# Patient Record
Sex: MALE | URBAN - METROPOLITAN AREA
[De-identification: names, ages, dates, MRNs, and addresses within clinical notes are randomized per-mention and may not be internally consistent; named-entity substitution may affect disease eponyms.]

---

## 2020-01-03 ENCOUNTER — APPOINTMENT (RX ONLY)
Dept: URBAN - METROPOLITAN AREA CLINIC 282 | Facility: CLINIC | Age: 85
Setting detail: DERMATOLOGY
End: 2020-01-03

## 2020-01-03 DIAGNOSIS — L20.84 INTRINSIC (ALLERGIC) ECZEMA: ICD-10-CM

## 2020-01-03 DIAGNOSIS — L82.1 OTHER SEBORRHEIC KERATOSIS: ICD-10-CM

## 2020-01-03 PROCEDURE — ? COUNSELING

## 2020-01-03 PROCEDURE — 99213 OFFICE O/P EST LOW 20 MIN: CPT

## 2020-01-03 PROCEDURE — ? PRESCRIPTION

## 2020-01-03 RX ORDER — DESOXIMETASONE 2.5 MG/G
CREAM TOPICAL
Qty: 1 | Refills: 2 | Status: ERX | COMMUNITY
Start: 2020-01-03

## 2020-01-03 RX ADMIN — DESOXIMETASONE: 2.5 CREAM TOPICAL at 00:00

## 2020-01-03 ASSESSMENT — LOCATION ZONE DERM
LOCATION ZONE: LEG
LOCATION ZONE: HAND
LOCATION ZONE: TRUNK

## 2020-01-03 ASSESSMENT — LOCATION SIMPLE DESCRIPTION DERM
LOCATION SIMPLE: LEFT HAND
LOCATION SIMPLE: LEFT PRETIBIAL REGION
LOCATION SIMPLE: RIGHT PRETIBIAL REGION
LOCATION SIMPLE: LOWER BACK

## 2020-01-03 ASSESSMENT — LOCATION DETAILED DESCRIPTION DERM
LOCATION DETAILED: LEFT RADIAL DORSAL HAND
LOCATION DETAILED: SUPERIOR LUMBAR SPINE
LOCATION DETAILED: LEFT DISTAL PRETIBIAL REGION
LOCATION DETAILED: RIGHT DISTAL PRETIBIAL REGION

## 2020-01-03 NOTE — PROCEDURE: MIPS QUALITY
Quality 402: Tobacco Use And Help With Quitting Among Adolescents: Patient screened for tobacco and never smoked
Quality 110: Preventive Care And Screening: Influenza Immunization: Influenza Immunization Administered during Influenza season
Quality 111:Pneumonia Vaccination Status For Older Adults: Pneumococcal Vaccination Previously Received
Quality 130: Documentation Of Current Medications In The Medical Record: Current Medications Documented
Detail Level: Detailed

## 2021-02-11 ENCOUNTER — APPOINTMENT (RX ONLY)
Dept: URBAN - METROPOLITAN AREA CLINIC 282 | Facility: CLINIC | Age: 86
Setting detail: DERMATOLOGY
End: 2021-02-11

## 2021-02-11 DIAGNOSIS — L20.84 INTRINSIC (ALLERGIC) ECZEMA: ICD-10-CM

## 2021-02-11 PROCEDURE — ? ADDITIONAL NOTES

## 2021-02-11 PROCEDURE — 99213 OFFICE O/P EST LOW 20 MIN: CPT

## 2021-02-11 PROCEDURE — ? PRESCRIPTION

## 2021-02-11 PROCEDURE — ? COUNSELING

## 2021-02-11 RX ORDER — DESOXIMETASONE 2.5 MG/G
CREAM TOPICAL
Qty: 1 | Refills: 3 | Status: ERX

## 2021-02-11 ASSESSMENT — LOCATION SIMPLE DESCRIPTION DERM
LOCATION SIMPLE: LEFT HAND
LOCATION SIMPLE: RIGHT PRETIBIAL REGION
LOCATION SIMPLE: RIGHT HAND
LOCATION SIMPLE: LEFT PRETIBIAL REGION
LOCATION SIMPLE: LEFT ANKLE

## 2021-02-11 ASSESSMENT — LOCATION DETAILED DESCRIPTION DERM
LOCATION DETAILED: LEFT DISTAL PRETIBIAL REGION
LOCATION DETAILED: RIGHT RADIAL DORSAL HAND
LOCATION DETAILED: LEFT RADIAL DORSAL HAND
LOCATION DETAILED: LEFT ANKLE
LOCATION DETAILED: RIGHT DISTAL PRETIBIAL REGION

## 2021-02-11 ASSESSMENT — LOCATION ZONE DERM
LOCATION ZONE: LEG
LOCATION ZONE: HAND

## 2022-05-29 ENCOUNTER — APPOINTMENT (OUTPATIENT)
Dept: GENERAL RADIOLOGY | Age: 87
End: 2022-05-29
Payer: MEDICARE

## 2022-05-29 ENCOUNTER — APPOINTMENT (OUTPATIENT)
Dept: CT IMAGING | Age: 87
End: 2022-05-29
Payer: MEDICARE

## 2022-05-29 ENCOUNTER — HOSPITAL ENCOUNTER (EMERGENCY)
Age: 87
Discharge: HOME | End: 2022-05-29
Payer: MEDICARE

## 2022-05-29 VITALS
RESPIRATION RATE: 18 BRPM | TEMPERATURE: 97.6 F | WEIGHT: 172 LBS | DIASTOLIC BLOOD PRESSURE: 63 MMHG | HEIGHT: 69 IN | OXYGEN SATURATION: 93 % | SYSTOLIC BLOOD PRESSURE: 111 MMHG | HEART RATE: 88 BPM | BODY MASS INDEX: 25.48 KG/M2

## 2022-05-29 DIAGNOSIS — S29.9XXA RIB INJURY: ICD-10-CM

## 2022-05-29 DIAGNOSIS — S52.501A CLOSED FRACTURE OF DISTAL END OF RIGHT RADIUS, UNSPECIFIED FRACTURE MORPHOLOGY, INITIAL ENCOUNTER: Primary | ICD-10-CM

## 2022-05-29 PROCEDURE — 73110 X-RAY EXAM OF WRIST: CPT

## 2022-05-29 PROCEDURE — 71101 X-RAY EXAM UNILAT RIBS/CHEST: CPT

## 2022-05-29 PROCEDURE — 29125 APPL SHORT ARM SPLINT STATIC: CPT

## 2022-05-29 PROCEDURE — 70450 CT HEAD/BRAIN W/O DYE: CPT

## 2022-05-29 PROCEDURE — 99284 EMERGENCY DEPT VISIT MOD MDM: CPT

## 2022-05-29 RX ORDER — HYDROCODONE BITARTRATE AND ACETAMINOPHEN 5; 325 MG/1; MG/1
1 TABLET ORAL EVERY 8 HOURS PRN
Qty: 9 TABLET | Refills: 0 | Status: SHIPPED | OUTPATIENT
Start: 2022-05-29 | End: 2022-06-01

## 2022-05-29 RX ORDER — LIDOCAINE 4 G/G
1 PATCH TOPICAL DAILY
Qty: 12 PATCH | Refills: 0 | Status: SHIPPED | OUTPATIENT
Start: 2022-05-29 | End: 2022-06-10

## 2022-05-29 NOTE — ED NOTES
Sugar tong splint applied to right arm. Right arm placed in sling after splint was applied.      Lovely Kline RN  05/29/22 [id number]

## 2022-05-29 NOTE — ED PROVIDER NOTES
[facility]    Department of Emergency Medicine   ED  Provider Note  Admit Date/RoomTime: 5/29/2022 10:46 AM  ED Room: CHEMA/CHEMA    5/29/22  11:20 AM EDT    History of Present Illness:   Kayal Doll is a 82 y.o. male presenting to the ED for right rib pain and right wrist pain. The complaint has been persistent, mild in severity, and worsened by movement of wrist or palpation of lateral ribs. Relieved by nothing. No medications tried prior to arrival.  Patient states last night he was attempting to walk in the hotel room and the lights were out and he tripped and had a mechanical fall. States that he fell with his right arm outstretched in front of him. He also landed on his right rib cage. He states he does not believe he hit his head. States that he got up off the floor and proceeded with his night and went to bed. States when he woke up today he realized he was having some right wrist and rib pain which prompted his ER visit. He denies any headache, neck pain, back pain. Patient denies any chest pain, syncope, dizziness before the fall. States that he did not lose consciousness. Patient does take Eliquis daily. Patient denies any confusion, lethargy, weakness, altered mental status, vision changes, blurred vision, vision loss. Denies any chest pain, shortness of breath, difficulty breathing, abdominal pain, nausea, vomiting, hemoptysis. Reports he has been eating and drinking normally. He has no history of injury or surgery to the right wrist or ribs. He has no other reported complaints from the fall. Patient does not want anything for pain control currently.       Review of Systems:   A complete review of systems was performed and pertinent positives and negatives are stated within HPI, all other systems reviewed and are negative.          --------------------------------------------- PAST HISTORY ---------------------------------------------  Past Medical History:  has a past medical history of Arthritis, Atrial fibrillation ([facility] [address] Dementia ([facility] [address] Hyperlipidemia, and Hypertension. Past Surgical History:  has no past surgical history on file. Social History:  reports that he has never smoked. He has never used smokeless tobacco. He reports current alcohol use. Family History: family history is not on file. Unless otherwise noted, family history is non contributory    The patients home medications have been reviewed. Allergies: Patient has no known allergies. -------------------------------------------------- RESULTS -------------------------------------------------  All laboratory and radiology results have been personally reviewed by myself   LABS:  No results found for this visit on 05/29/22. RADIOLOGY:  Interpreted by Radiologist.  CT Head WO Contrast   Final Result   No acute intracranial abnormality. XR RIBS RIGHT INCLUDE CHEST (MIN 3 VIEWS)   Final Result   No acute abnormality of the ribs. No acute process in the lungs. XR WRIST RIGHT (MIN 3 VIEWS)   Final Result   Nondisplaced transverse extra-articular fracture involving the dorsal aspect   of the distal radius. Ulna appears normal.             ------------------------- NURSING NOTES AND VITALS REVIEWED ---------------------------   The nursing notes within the ED encounter and vital signs as below have been reviewed. /63   Pulse 88   Temp 97.6 °F (36.4 °C) (Infrared)   Resp 18   Ht 5' 9\" (1.753 m)   Wt 172 lb (78 kg)   SpO2 93%   BMI 25.40 kg/m²   Oxygen Saturation Interpretation: Normal      ---------------------------------------------------PHYSICAL EXAM--------------------------------------    Constitutional/General: Alert and oriented x3, well appearing, non toxic in NAD, pleasant  Head: Normocephalic and atraumatic, no kidd sign.  No pain with palpation of facial bones  Ears: TM normal, no hemotympanum, no ear bleeding or drainage, no mastoid tenderness or erythema  Eyes: PERRL, EOMI, conjunctiva normal, sclera non icteric, no eye drainage, no nystagmus  Mouth: Oropharynx clear, without erythema, handling secretions, no trismus, no asymmetry of the posterior oropharynx or uvular edema. No tongue/lip swelling. Neck: Supple, full ROM, non tender to palpation in the midline, no stridor, no crepitus, no meningeal signs. No lymphadenopathy. Respiratory: Lungs clear to auscultation bilaterally, no wheezes, rales, or rhonchi. Not in respiratory distress. Respirations easy and unlabored. Cardiovascular:  S1S2. Regular rate. Regular rhythm. No murmurs, gallops, or rubs. 2+ distal pulses  Chest: Right lateral chest wall tenderness with palpation, no obvious deformity, no flail chest no step offs  GI:  Abdomen Soft, Non tender, Non distended. +BS x 4 quadrants. No organomegaly, no palpable masses,  No rebound, guarding, or rigidity. Musculoskeletal: Moves all extremities x 4. Warm and well perfused, no clubbing, cyanosis, or edema. Capillary refill <3 seconds. TTP right distal radius and ulna, full ROM. Radial and brachial pulses 2+ bilaterally.  strength strong bilaterally. Compartments soft. Integument: skin warm and dry. No rashes. No erythema. No abrasions. No bruising. Lymphatic: no lymphadenopathy noted  Neurologic: GCS 15, no focal deficits, symmetric strength 5/5 in the upper and lower extremities bilaterally, neurovascularly intact. Psychiatric: Normal Affect      ------------------------------ ED COURSE/MEDICAL DECISION MAKING----------------------  Medications - No data to display         Medical Decision Making: At this time the patient is without objective evidence of an acute process requiring hospitalization or inpatient management. They have remained hemodynamically stable throughout their entire ED visit and are stable for discharge with outpatient follow up.    The plan has been discussed in detail and they are aware of the specific conditions for emergent return,

## 2023-12-19 NOTE — ED NOTES
December 19, 2023       Lenny Saleem MD  [address]  [facility] [address]  [place] [address]  Via Fax: [phone]      Patient: Juan Petersen   YOB: 1935   Date of Visit: 12/19/2023       Dear Dr. Saleem:    Thank you for referring Juan Petersen to me for evaluation. Below are my notes for this visit with him.    If you have questions, please do not hesitate to call me. I look forward to following your patient along with you.      Sincerely,        Dmitriy Otto MD        CC: No Recipients  Dmitriy Otto MD  12/19/2023  6:20 PM  Signed                                        [facility]                           Cardiology                Structural Heart Consult      PCP: Lenny Saleem MD    Chief Complaint   Patient presents with   • Office Visit   • Follow-up     No cardiac complaints        HPI  Juan Petersen is a 88 year old male with a history of atrial fibrillation on anticoagulation, moderate mitral regurgitation hypertension and hyperlipidemia who presents here for initial structural heart evaluation for possible left atrial appendage occluder device.  Patient states that he has been tolerating anticoagulation well with no bleeds.  However the last couple months he has had numerous falls given balance issues.  He currently takes gabapentin which has not helped his neuropathy.  No other bleeding at this time.  He denies any chest pain, shortness of breath or palpitations. He remains active.  Primary Cardiologist: Cleopatra    Past Medical History  Past Medical History:   Diagnosis Date   • Aortic valve insufficiency    • Atrial fibrillation (CMD)    • Condition not found     Mild to moderate diisease   • Dyslipidemia    • Mitral valve insufficiency    • Mitral valve prolapse        Past Surgical History  Past Surgical History:   Procedure Laterality Date   • Case request clinic to cath/vasc      October 2014       Family History  Family History   Problem Relation  [facility] here for transport back to [address].      Kenisha James, RN  05/29/22 7321 Age of Onset   • Coronary Artery Disease Neg Hx         Negative for premature CAD.    • Aneurysm Neg Hx         Negative for AAA.       Social History  Social History     Tobacco Use   • Smoking status: Never   • Smokeless tobacco: Never   • Tobacco comments:     Never used tobacco. denies smoking.    Vaping Use   • Vaping Use: never used   Substance Use Topics   • Alcohol use: Not Currently     Comment: drinks 1-2 per day.        Allergies  ALLERGIES:  No Known Allergies    Current Medications  Current Medications    APIXABAN (ELIQUIS) 5 MG TAB    twice daily    CARVEDILOL (COREG) 3.125 MG TABLET    Take 1 tablet by mouth 2 times daily (with meals).    CHOLECALCIFEROL (VITAMIN D3) 20 MCG (800 UNITS) TABLET    Take 800 Units by mouth daily.    LEVOTHYROXINE 50 MCG TABLET    Take 50 mcg by mouth daily. 1 TABLET DAILY.    LISINOPRIL (ZESTRIL) 20 MG TABLET    1 TABLET BY MOUTH EVERY DAY    SIMVASTATIN (ZOCOR) 40 MG TABLET    1 PO QHS       Review of Systems  13 point ROS negative except those mentioned above in the HPI    Physical Exam  Visit Vitals  /69 (BP Location: LUE - Left upper extremity, Patient Position: Sitting, Cuff Size: Large Adult)   Pulse 73   Ht 6' 1\" (1.854 m)   Wt 107 kg (235 lb 14.3 oz)   BMI 31.12 kg/m²     Vital signs were reviewed today.    General: Alert/oriented  HEENT: Normacephalic, EOMI  Neck: no JVP or carotid bruit  Chest: CTA bilaterally  CV: irreg irreg, SM+  Abd: soft. NTD  Ext: no edema  Skin: no bruising  Neuro: CN grossly intact  Psych: cooperative      Recent Labs:   Reviewed    Diagnostic Testing:   ECHO (2022)  1. Left ventricle: The cavity size is mildly dilated. Wall thickness is     mildly increased. There is concentric hypertrophy. Systolic function is     mildly reduced. The ejection fraction was measured by biplane method of     disks. The ejection fraction is 50%.  2. Left atrium: The atrium is severely dilated.  3. Right ventricle: The cavity size is normal. Wall  thickness is normal.     Systolic function is normal.  4. Mitral valve: The annulus is normal. The leaflets are mildly thickened.     Systolic bowing without prolapse. Moderate regurgitation.  5. Aortic valve: Mild regurgitation.  6. Tricuspid valve: Mild regurgitation.  7. Pulmonic valve: Mild regurgitation.  8. Ascending aorta: The ascending aorta is mildly dilated at 4.3 cm.  9. Baseline ECG: Atrial fibrillation.  Impressions:   The study shows no significant change since the previous  study.    Assessment :  1. Chronic atrial fibrillation (CMD)    2. Moderate mitral regurgitation    3. Hyperlipidemia, mixed    4. Thoracic aortic aneurysm without rupture, unspecified part (CMD)    5. Dilated cardiomyopathy (CMD)    6. Long term (current) use of anticoagulants      Juan is an indication for an LAAO device.  Discussed with him the procedure along with risks, benefits and alternatives.  He does remain active for his age and therefore with the balance issues, I feel like he would be a good candidate for the procedure.  I do want him to finish his workup for neuropathy which she is undergoing at [facility].  Once he has done with that, he will let us know if he is still interested in pursuing device placement.  In the meantime, he should continue anticoagulation.      Plan:  -> Continue current medications  -> Once neuropathy workup is completed, let us know if you would like to proceed with LAAO ([facility]) workup  -> Follow up with Dr. Whelan as scheduled         Dmitriy Otto MD  Interventional Cardiology / EndoVascular